# Patient Record
(demographics unavailable — no encounter records)

---

## 2024-10-30 NOTE — DISCUSSION/SUMMARY
[FreeTextEntry1] : Ongoing URI causing cough. Reassured that his lungs are clear. No need for albuterol as he isn't wheezing but okay to try it for symptomatic relief. RTC if worsening, fever, or other new symptoms.

## 2024-10-30 NOTE — PHYSICAL EXAM
[NL] : warm, clear [FreeTextEntry1] : playful. occasional wet cough. [FreeTextEntry7] : easy WOB, no wheezing, excellent a/e

## 2024-10-30 NOTE — HISTORY OF PRESENT ILLNESS
Pt taken to [id number] was in no acute distress vs stable no further vomiting. Mom notified by telephone    [de-identified] : Cough [FreeTextEntry6] : Cough: Jimmy, a 6-year-old male, presents with a cough that has persisted for at least a week. The cough has not improved and seems to have deepened. It is worse at night, causing some sleep disturbance, but does not lead to vomiting or gagging. Jimmy is still able to participate in his activities but appears more winded than usual. Jimmy also reports some ear discomfort. No fever.

## 2024-11-07 NOTE — HISTORY OF PRESENT ILLNESS
[de-identified] : Cough [FreeTextEntry6] : Was improving but worsened again last night. No fever. Elderly GM visiting so want to make sure he doesn't have anything contagious. Slept okay, went to school this morning. No ear pain, no sore throat. Using Mucinex, honey, albuterol.

## 2025-02-17 NOTE — DISCUSSION/SUMMARY
[FreeTextEntry1] : 6 year M with influenza. Recommend supportive care including antipyretics, fluids, rest, and nasal saline followed by nasal suction. Tamiflu is not indicated for patient as he's been sick >48 hours. Return if symptoms worsen or persist >5 days.   RS neg, TC sent at mother's request.  No wheezing currently but okay to try albuterol 2p q4h prn for his cough.

## 2025-02-17 NOTE — HISTORY OF PRESENT ILLNESS
[de-identified] : Fever and Sore Throat [FreeTextEntry6] : x 4 days. Runny nose, cough, decreased po but is drinking. No v/d. No ear pain. Father had the flu last week.

## 2025-02-21 NOTE — HISTORY OF PRESENT ILLNESS
[de-identified] : Flu [FreeTextEntry6] : Overall feels much better but still with a bad cough. Was very tired this morning, more energetic after eating. Planning on visiting his grandparents this weekend so want to make sure his lungs are okay.

## 2025-02-21 NOTE — DISCUSSION/SUMMARY
[FreeTextEntry1] : Recovering from Flu, reassured lungs are clear. May take a few weeks for the cough to resolve but not contagious. RTC if worsening or other new symptoms.

## 2025-02-21 NOTE — HISTORY OF PRESENT ILLNESS
[de-identified] : Flu [FreeTextEntry6] : Overall feels much better but still with a bad cough. Was very tired this morning, more energetic after eating. Planning on visiting his grandparents this weekend so want to make sure his lungs are okay.

## 2025-04-01 NOTE — PHYSICAL EXAM
[EOMI] : grossly EOMI [Allergic Shiners] : allergic shiners [Pale Nasal Mucosa] : pale nasal mucosa [Cobblestoning] : cobblestoning of posterior pharynx [NL] : warm, clear [Eyelid Swelling] : no eyelid swelling [FreeTextEntry5] : mild conjunctival injection OD with slight tearing but no discharge [FreeTextEntry4] : allergic salute and transverse nasal crease

## 2025-04-01 NOTE — HISTORY OF PRESENT ILLNESS
[de-identified] : R Eye Redness [FreeTextEntry6] : Noticed this morning, sent to the nurse at school who sent him home. No eye discharge. Is itchy. No FB sensation. No fever. Allergies have started acting up despite Zyrtec and Flonase--itchy eyes and nose, scratchy throat. Was outside a lot yesterday.

## 2025-04-01 NOTE — DISCUSSION/SUMMARY
[FreeTextEntry1] : 6 year M with allergic rhinitis and conjunctivitis. Avoid exposure to environmental allergens. Wash hands and clothing after being outdoors. Recommend supportive care with oral long-acting antihistamine daily. Use nasal saline 2-3 times daily. For nasal congestion may use nasal steroid daily. Possible side effect of nasal steroid includes but not limited to thinning of nasal mucosa. For eye symptoms may use OTC allergy eye drops per package instructions. RTC if worsening or develops new symptoms.  Much more c/w allergic conjunctivitis, may return to school. Mom to call if he develops copious eye discharge and would send in Rx for antibiotic eye drops.

## 2025-04-11 NOTE — PHYSICAL EXAM
[NL] : no acute distress, alert [de-identified] : skin diffusely dry with excoriations and some slight thickening in antecub fossae [FreeTextEntry1] : scratching at his L antecub

## 2025-04-11 NOTE — HISTORY OF PRESENT ILLNESS
[de-identified] : Rash [FreeTextEntry6] : Complaining that the inside of his elbows is itchy and hurts. No itchy spots anywhere else. Taking Zyrtec currently for his seasonal allergies.

## 2025-04-11 NOTE — DISCUSSION/SUMMARY
[FreeTextEntry1] : 6 year M with rash c/w eczema.  Recommend liberal bland emollient use and topical steroid 2x/day as needed. Side effect of topical steroids includes but not limited to lightening of skin. Avoid synthetic clothing. Bathe every 2-3 days, avoiding hot water. Sleep with cool mist humidifier. Antihistamines as needed for pruritus.

## 2025-04-28 NOTE — HISTORY OF PRESENT ILLNESS
[de-identified] : Sore Throat [FreeTextEntry6] : x4 days. No fever. Cough the last couple of days, as well. Coughing in his sleep. Throat feels itchy. Allergies are acting up currently--itchy nose and eyes, sneezing a lot. Doing Zyrtec, Flonase, and eye drops.

## 2025-04-28 NOTE — DISCUSSION/SUMMARY
[FreeTextEntry1] : 6 year M with allergic rhinitis. Avoid exposure to environmental allergens. Wash hands and clothing after being outdoors. Recommend supportive care with oral long-acting antihistamine daily. Use nasal saline 2-3 times daily. For nasal congestion may use nasal steroid daily. Possible side effect of nasal steroid includes but not limited to thinning of nasal mucosa. For eye symptoms may use OTC allergy eye drops per package instructions. RTC if worsening or develops new symptoms.  RS neg, TC deferred as clearly allergic. Given symptom severity every spring, rec f/u with allergist.  No wheezing currently but can always try albuterol to see if it helps with the cough.

## 2025-04-28 NOTE — PHYSICAL EXAM
[Allergic Shiners] : allergic shiners [Pale Nasal Mucosa] : pale nasal mucosa [Cobblestoning] : cobblestoning of posterior pharynx [NL] : warm, clear [FreeTextEntry4] : transverse nasal crease [de-identified] : tonsils not erythematous, 1+, cobblestoning with visible post-nasal drip

## 2025-05-20 NOTE — DISCUSSION/SUMMARY
[FreeTextEntry1] : 6 year M with mild head trauma yesterday. No concerning features on history or PE to suggest intracranial injury. Parents counseled to seek medical attention immediately if there is witnessed loss of consciousness, definite amnesia, witnessed disorientation, persistent vomiting (more than one episode) or persistent irritability.   No concussion features but rec taking it easy today. Tylenol prn for headache. Ice to head bid and prn.

## 2025-05-20 NOTE — HISTORY OF PRESENT ILLNESS
[de-identified] : Head Injury [FreeTextEntry6] : Smacked his forehead against the corner of the fridge handle last night. Hard enough that there was a loud thunk. No LOC, no emesis. Went to bed shortly afterwards and slept well. Today is very tired and complaining that his head hurts. Hurts only in the site of the injury. No nausea or vomiting. No dizziness. No photophobia. No difficulty concentrating--watched TV and colored this morning. No increase in headache with those activities.

## 2025-05-20 NOTE — PHYSICAL EXAM
[NL] : normotonic [Normotonic] : normotonic [+2 Patella DTR] : +2 patella DTR [FreeTextEntry2] : small hematoma L side of forehead, mildly tender. no underlying bogginess or step offs. no other palpable injuries. [FreeTextEntry5] : PERRL [de-identified] : CN2-12 grossly intact, strength 5/5 throughout, sensation to light touch intact, MORIS intact, Romberg neg. Normal gait.

## 2025-06-06 NOTE — HISTORY OF PRESENT ILLNESS
[de-identified] : Toenail Discoloration [FreeTextEntry6] : Noticed earlier today that his R 4th toenail looked white at the top; concerned that it could be fungus. Jimmy says it hurts a little. Not impacting his gait or ability to do activities. Jimmy said in the office that he hit it against something the other day but when asked the same question at home had said no.

## 2025-06-06 NOTE — DISCUSSION/SUMMARY
[FreeTextEntry1] : Toenail discoloration, suspect from trauma vs his shoes being too tight and the nail rubbing against the shoe. Rec sizing up his shoes. Reassured that not fungal and no need for any treatment.

## 2025-06-06 NOTE — PHYSICAL EXAM
[NL] : no acute distress, alert [de-identified] : distal toenail on R 4th digit curved over the front of the toe and slightly white in appearance. not thickened.

## 2025-06-26 NOTE — HISTORY OF PRESENT ILLNESS
[de-identified] : Itchy Scrotum [FreeTextEntry6] : For ~2 weeks intermittently complains that his "pee pee" hurts or is itchy. When asked to clarify, it seems to be his scrotum that's bothering him. No difficulty with urination, doesn't hurt to pee, no urinary retention. BMs wnl, no constipation.

## 2025-06-26 NOTE — DISCUSSION/SUMMARY
[FreeTextEntry1] : Mild eczema patch on scrotum. Rec OTC hydrocortisone 1% bid x 1 week, liberal Vaseline or Aquaphor. Change out of wet underwear or swimsuits quickly. RTC if worsening or other new symptoms.

## 2025-06-26 NOTE — HISTORY OF PRESENT ILLNESS
[de-identified] : Itchy Scrotum [FreeTextEntry6] : For ~2 weeks intermittently complains that his "pee pee" hurts or is itchy. When asked to clarify, it seems to be his scrotum that's bothering him. No difficulty with urination, doesn't hurt to pee, no urinary retention. BMs wnl, no constipation.

## 2025-06-26 NOTE — PHYSICAL EXAM
[NL] : no acute distress, alert [Pancho: ____] : Pancho [unfilled] [Normal external genitalia] : normal external genitalia [Circumcised] : circumcised [Urethral Discharge] : no urethral discharge [Penile Adhesion] : no penile adhesion [Undescended Testicle] : descended testicle(s) [Retractile Testicle] : no retractile testicle [Hydrocele] : no hydrocele [Varicocele] : no varicocele [FreeTextEntry6] : no inguinal hernia. dry, pink patches on scrotum with excoriations.

## 2025-07-03 NOTE — DISCUSSION/SUMMARY
[FreeTextEntry1] : R otalgia and malodor, normal exam today. Obs for now and parent to call if worsening ear pain + drainage.

## 2025-07-03 NOTE — HISTORY OF PRESENT ILLNESS
[de-identified] : Ear Pain [FreeTextEntry6] : R ear pain x 1.5 days. Smells, too. Swimming a lot last week. No fever or other illness. Doesn't feel wet.

## 2025-07-03 NOTE — PHYSICAL EXAM
[NL] : nonerythematous oropharynx [FreeTextEntry3] : L TM and canal clear. R canal clear. TM with small perforation (baseline) but otherwise clear. No drainage, no pain with exam.

## 2025-07-10 NOTE — DISCUSSION/SUMMARY
[FreeTextEntry1] : Improving otitis externa, complete Ciprodex course. Not sure why the drops hurt so much yesterday but could use wick when putting in.

## 2025-07-10 NOTE — PHYSICAL EXAM
[NL] : no acute distress, alert [Clear] : left tympanic membrane clear [FreeTextEntry3] : small amount of white debris in canal on R, no pain with retraction of pinna. R TM with baseline perf

## 2025-07-10 NOTE — HISTORY OF PRESENT ILLNESS
[de-identified] : Swimmer's Ear [FreeTextEntry6] : Overall better but yesterday screamed for like 1 hour after putting in the ear drops. No fever or other new symptoms. Ear pain overall better. Drainage and smell basically resolved.